# Patient Record
Sex: MALE | Race: WHITE | URBAN - METROPOLITAN AREA
[De-identification: names, ages, dates, MRNs, and addresses within clinical notes are randomized per-mention and may not be internally consistent; named-entity substitution may affect disease eponyms.]

---

## 2018-01-12 ENCOUNTER — CARE COORDINATION (OUTPATIENT)
Dept: OTOLARYNGOLOGY | Facility: CLINIC | Age: 73
End: 2018-01-12

## 2018-01-12 NOTE — PROGRESS NOTES
Pt left message with the call center: wanting to know if he can have an MRI done- pt had a surgery by Dr. Dorsey in 2016.  Per operative note:The prosthesis placed is the Gyrus classic stapes prosthesis with a large well, 1 mm inner diameter, narrow shaft, 0.4 mm wide, 4 mm in length.  This is made of titanium.  It is reference [id number], lot [id number].   The prosthesis is MRI safe- this message left on pt voice mail  Kelly Duran RN  ENT Care Coordinator   Otology  [phone]  1/12/2018 8:25 AM

## 2018-11-07 ENCOUNTER — TELEPHONE (OUTPATIENT)
Dept: OTOLARYNGOLOGY | Facility: CLINIC | Age: 73
End: 2018-11-07

## 2018-11-07 NOTE — TELEPHONE ENCOUNTER
[facility] Call Center    Phone Message    May a detailed message be left on voicemail: yes    Reason for Call: Other: Pt has seen Dr Dorsey before and recently got vertigo 2x when someone was talking to him in his left ear. He wants to know if is something he should follow up on or if it could be serious. Please have someone call him to discuss     Action Taken: Message routed to:  [facility] see above

## 2018-11-13 NOTE — TELEPHONE ENCOUNTER
Returned call to patient who states that about 1 week ago he experienced vertigo twice when someone was talking to him in his left ear. He has not had this happen since and has not noticed any changes in his hearing. He denies all other symptoms. Offered and appointment with Dr. Dorsey but patient decline at this time. He will continue to monitor and call with any changes or new symptoms. Patient was given direct line for future calls.     Negra Mcdaniel, RN, BSN

## 2023-02-16 ENCOUNTER — TELEPHONE (OUTPATIENT)
Dept: OTOLARYNGOLOGY | Facility: CLINIC | Age: 78
End: 2023-02-16
Payer: COMMERCIAL

## 2023-02-16 NOTE — TELEPHONE ENCOUNTER
Faxed information to the MRI/imaging. Letter is in his chart if he needs it. Can call medical records at [phone] if he has more questions.     Mary Ann Recinos LPN

## 2023-02-16 NOTE — TELEPHONE ENCOUNTER
[facility] Call Center    Phone Message    May a detailed message be left on voicemail: yes     Reason for Call: Other: The pt needs to get an MRI of his ear in [place], [place]. Prior to that, he needs the information regarding the prosthesis put in his ear in 2016. He needs the Name, Name of the prosthesis, the model,  and serial #. He needs a call back with that info, and to have it faxed to the [facility] in [place], [place] to F: [phone]. He asked that he be called back tomorrow.  Thanks.     Action Taken: Message routed to:  [facility] ENT    Travel Screening: Not Applicable

## 2023-02-17 NOTE — TELEPHONE ENCOUNTER
[facility] Call Center    Phone Message    May a detailed message be left on voicemail: yes     Reason for Call: Other: Pt wife calling in regards to Imaging center stating that the device we inserted is not compatabile with their MRI machine and pts wife wants to know if it is FORSURE incompatable and what they can do about this . Please reachout to pt or pts wife to discuss. thank you      Action Taken: Message routed to:  [facility] ENT     Travel Screening: Not Applicable

## 2023-02-17 NOTE — TELEPHONE ENCOUNTER
It is the Dr that is requesting the imaging that needs to find a MRI machine that is compitable with his device. He could have a MRI but would have restrictions on where. No further questions.    Mary Ann Recinos LPN